# Patient Record
Sex: MALE
[De-identification: names, ages, dates, MRNs, and addresses within clinical notes are randomized per-mention and may not be internally consistent; named-entity substitution may affect disease eponyms.]

---

## 2022-09-12 ENCOUNTER — APPOINTMENT (OUTPATIENT)
Dept: ORTHOPEDIC SURGERY | Facility: CLINIC | Age: 42
End: 2022-09-12

## 2022-09-12 VITALS — HEIGHT: 74 IN | WEIGHT: 210 LBS | BODY MASS INDEX: 26.95 KG/M2

## 2022-09-12 DIAGNOSIS — M25.562 PAIN IN LEFT KNEE: ICD-10-CM

## 2022-09-12 DIAGNOSIS — Z78.9 OTHER SPECIFIED HEALTH STATUS: ICD-10-CM

## 2022-09-12 PROBLEM — Z00.00 ENCOUNTER FOR PREVENTIVE HEALTH EXAMINATION: Status: ACTIVE | Noted: 2022-09-12

## 2022-09-12 PROCEDURE — 73562 X-RAY EXAM OF KNEE 3: CPT | Mod: LT

## 2022-09-12 PROCEDURE — 99203 OFFICE O/P NEW LOW 30 MIN: CPT

## 2022-09-12 RX ORDER — ANASTROZOLE TABLETS 1 MG/1
TABLET ORAL
Refills: 0 | Status: ACTIVE | COMMUNITY

## 2022-09-12 NOTE — DISCUSSION/SUMMARY
[de-identified] :   At this time is going to rest and ice area, Tylenol or Motrin as needed for pain.  I would like to get an MRI of his knee to rule out a meniscal tear.  Will schedule him a follow-up for repeat evaluation.  He can call me after the MRI to go over the results. Patient will call me if any other problems or concerns.  Patient verbalized understanding and agreed with the plan, all questions were answered in the office today.\par

## 2022-09-12 NOTE — IMAGING
[de-identified] :   On examination of his left knee he has mild swelling, no erythema, no ecchymosis.  He is nontender to the patella or the patellar facets.  Negative patellar grind.  He is able to straight leg raise.  He has good range of motion of the knee, some pain with full flexion.  Negative varus and valgus stress test, negative anterior and posterior drawer.  He is tender over the medial joint line, he has a positive Pauline's.  No tenderness over the lateral joint line.  Calf is soft and nontender.\par \par X-rays taken in the office a the left knee show no acute fractures, dislocations, or other bony abnormalities.

## 2022-09-12 NOTE — HISTORY OF PRESENT ILLNESS
[de-identified] :  41-year-old male is here today for evaluation of his left knee.  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  He states he felt a pain and a pop in the medial aspect of his knee.  He states since then he has been having difficulty putting pressure on the knee and squatting down when he is doing jujitsu.  He denies any previous injuries or surgeries on this knee.  He has not been taking anything for the pain.  He is here today for further evaluation.

## 2022-09-19 ENCOUNTER — APPOINTMENT (OUTPATIENT)
Dept: MRI IMAGING | Facility: CLINIC | Age: 42
End: 2022-09-19

## 2022-10-14 ENCOUNTER — APPOINTMENT (OUTPATIENT)
Dept: ORTHOPEDIC SURGERY | Facility: CLINIC | Age: 42
End: 2022-10-14